# Patient Record
Sex: FEMALE | Race: WHITE
[De-identification: names, ages, dates, MRNs, and addresses within clinical notes are randomized per-mention and may not be internally consistent; named-entity substitution may affect disease eponyms.]

---

## 2018-08-01 ENCOUNTER — HOSPITAL ENCOUNTER (OUTPATIENT)
Age: 63
End: 2018-08-01
Payer: COMMERCIAL

## 2018-08-01 DIAGNOSIS — Z12.31: Primary | ICD-10-CM

## 2018-08-01 PROCEDURE — 77063 BREAST TOMOSYNTHESIS BI: CPT

## 2018-08-01 PROCEDURE — 77067 SCR MAMMO BI INCL CAD: CPT

## 2018-08-01 NOTE — DI.MG.S_ITS
BILATERAL DIGITAL SCREENING MAMMOGRAM 3D/2D WITH CAD: 8/1/2018  
CLINICAL: Routine screening.    
   
Comparison is made to exams dated:  12/9/2016 mammogram - [facility] in [place],   
9/29/2014 mammogram, and 9/27/2013 mammogram - [facility].    
There are scattered fibroglandular elements in both breasts.    
Current study was also evaluated with a Computer Aided Detection (CAD) system.    
There is a mole marker on the left breast.    
No significant masses, calcifications, or other findings are seen in either breast.    
There has been no significant interval change.  
   
IMPRESSION: NEGATIVE  
There is no mammographic evidence of malignancy. A 1 year screening mammogram is   
recommended.     
   
This exam was interpreted at Station ID: [id number].    
   
NOTE: For mammograms, a report in lay terms will be sent to the patient. Approximately   
15% of breast malignancies will not be visualized mammographically. In the management of   
a palpable breast mass, a negative mammogram must not discourage biopsy of a clinically   
suspicious lesion.  
   
Electronically Signed By: Jan cueto/vera:8/1/2018 20:04:23    
   
   
letter sent: Normal Exam    
ACR BI-RADS Category 1: Negative 3341F

## 2018-09-24 ENCOUNTER — HOSPITAL ENCOUNTER (OUTPATIENT)
Dept: HOSPITAL 76 - DI | Age: 63
Discharge: HOME | End: 2018-09-24
Attending: PHYSICIAN ASSISTANT
Payer: COMMERCIAL

## 2018-09-24 DIAGNOSIS — R94.31: ICD-10-CM

## 2018-09-24 DIAGNOSIS — Z82.49: ICD-10-CM

## 2018-09-24 DIAGNOSIS — R53.83: Primary | ICD-10-CM

## 2018-09-24 DIAGNOSIS — Z78.0: ICD-10-CM

## 2018-09-24 PROCEDURE — 93017 CV STRESS TEST TRACING ONLY: CPT

## 2018-09-24 NOTE — CARDIAC PROCEDURE NOTE
DATE OF SERVICE: 09/24/2018

Physician: Hanny Velarde MD, [facility]

 

INDICATIONS FOR TEST:  Fatigue with exertion, family history of early coronary 
disease.

 

CORONARY RISK FACTORS:  Family history of early coronary disease, postmenopausal
status, borderline cholesterol elevation.

 

After signing informed consent, the patient completed a Renato-protocol treadmill
stress test.

 

RESTING EKG:  Normal sinus rhythm, right IVCD.  

 

Resting blood pressure 90/60, peak blood pressure 140/60.  Resting heart rate 
78, peak heart rate 141 (which is 89 % predicted maximum heart rate for age).

 

Patient exercised for 3 minutes and 25 seconds on a Renato protocol.  The patient
achieved 89% predicted maximum heart rate for age, and 8.4 METS.  The patient 
had mild to moderate shortness of breath, no chest pain.

 

EKG AT PEAK:  T-wave inversion in leads III and aVF, and in leads V2 and V3.  
These resolved after 2 minutes of recovery.

 

IMPRESSION:

1.  Fair to poor exercise tolerance.

2.  Abnormal resting EKG (consideran adequate level of stress, indicating 
possible ischemia.

 

RECOMMENDATIONS:   Consider aggressive risk factor management and further 
cardiac evaluation with cardiac imaging: a Stress Echocardiogram, or stress test
with myocardial perfusion imaging using nuclear testing.

 

 

cc: Akilah Rivera PA-C

DD: 09/24/2018 11:20

TD: 09/24/2018 11:27

Job #: [id number]

SAMINA

## 2018-10-04 ENCOUNTER — HOSPITAL ENCOUNTER (OUTPATIENT)
Dept: HOSPITAL 76 - LAB.WCP | Age: 63
Discharge: HOME | End: 2018-10-04
Attending: PHYSICIAN ASSISTANT
Payer: COMMERCIAL

## 2018-10-04 DIAGNOSIS — Z00.00: Primary | ICD-10-CM

## 2018-10-04 LAB
ALBUMIN DIAFP-MCNC: 4 G/DL (ref 3.2–5.5)
ALBUMIN/GLOB SERPL: 1.2 {RATIO} (ref 1–2.2)
ALP SERPL-CCNC: 92 IU/L (ref 42–121)
ALT SERPL W P-5'-P-CCNC: 27 IU/L (ref 10–60)
ANION GAP SERPL CALCULATED.4IONS-SCNC: 9 MMOL/L (ref 6–13)
AST SERPL W P-5'-P-CCNC: 24 IU/L (ref 10–42)
BASOPHILS NFR BLD AUTO: 0.1 10^3/UL (ref 0–0.1)
BASOPHILS NFR BLD AUTO: 1.2 %
BILIRUB BLD-MCNC: 0.6 MG/DL (ref 0.2–1)
BUN SERPL-MCNC: 15 MG/DL (ref 6–20)
CALCIUM UR-MCNC: 9.4 MG/DL (ref 8.5–10.3)
CHLORIDE SERPL-SCNC: 101 MMOL/L (ref 101–111)
CHOLEST SERPL-MCNC: 249 MG/DL
CO2 SERPL-SCNC: 29 MMOL/L (ref 21–32)
CREAT SERPLBLD-SCNC: 0.8 MG/DL (ref 0.4–1)
EOSINOPHIL # BLD AUTO: 0.1 10^3/UL (ref 0–0.7)
EOSINOPHIL NFR BLD AUTO: 2.5 %
ERYTHROCYTE [DISTWIDTH] IN BLOOD BY AUTOMATED COUNT: 12.7 % (ref 12–15)
EST. AVERAGE GLUCOSE BLD GHB EST-MCNC: 105 MG/DL (ref 70–100)
GFRSERPLBLD MDRD-ARVRAT: 72 ML/MIN/{1.73_M2} (ref 89–?)
GLOBULIN SER-MCNC: 3.3 G/DL (ref 2.1–4.2)
GLUCOSE SERPL-MCNC: 98 MG/DL (ref 70–100)
HB2 TOTAL: 14.2 G/DL
HBA1C BLD-MCNC: 0.49 G/DL
HDLC SERPL-MCNC: 82 MG/DL
HDLC SERPL: 3 {RATIO} (ref ?–4.4)
HEMOGLOBIN A1C %: 5.3 % (ref 4.6–6.2)
HGB UR QL STRIP: 13.3 G/DL (ref 12–16)
LDLC SERPL CALC-MCNC: 153 MG/DL
LDLC/HDLC SERPL: 1.9 {RATIO} (ref ?–4.4)
LYMPHOCYTES # SPEC AUTO: 2.1 10^3/UL (ref 1.5–3.5)
LYMPHOCYTES NFR BLD AUTO: 47.5 %
MCH RBC QN AUTO: 32 PG (ref 27–31)
MCHC RBC AUTO-ENTMCNC: 34.8 G/DL (ref 32–36)
MCV RBC AUTO: 91.8 FL (ref 81–99)
MONOCYTES # BLD AUTO: 0.3 10^3/UL (ref 0–1)
MONOCYTES NFR BLD AUTO: 7.8 %
NEUTROPHILS # BLD AUTO: 1.8 10^3/UL (ref 1.5–6.6)
NEUTROPHILS # SNV AUTO: 4.3 X10^3/UL (ref 4.8–10.8)
NEUTROPHILS NFR BLD AUTO: 41 %
PDW BLD AUTO: 7.8 FL (ref 7.9–10.8)
PLATELET # BLD: 257 10^3/UL (ref 130–450)
PROT SPEC-MCNC: 7.3 G/DL (ref 6.7–8.2)
RBC MAR: 4.15 10^6/UL (ref 4.2–5.4)
SODIUM SERPLBLD-SCNC: 139 MMOL/L (ref 135–145)
VLDLC SERPL-SCNC: 14 MG/DL

## 2018-10-04 PROCEDURE — 85025 COMPLETE CBC W/AUTO DIFF WBC: CPT

## 2018-10-04 PROCEDURE — 83036 HEMOGLOBIN GLYCOSYLATED A1C: CPT

## 2018-10-04 PROCEDURE — 84443 ASSAY THYROID STIM HORMONE: CPT

## 2018-10-04 PROCEDURE — 83721 ASSAY OF BLOOD LIPOPROTEIN: CPT

## 2018-10-04 PROCEDURE — 80053 COMPREHEN METABOLIC PANEL: CPT

## 2018-10-04 PROCEDURE — 36415 COLL VENOUS BLD VENIPUNCTURE: CPT

## 2018-10-04 PROCEDURE — 80061 LIPID PANEL: CPT

## 2018-10-18 ENCOUNTER — HOSPITAL ENCOUNTER (OUTPATIENT)
Dept: HOSPITAL 76 - DI | Age: 63
Discharge: HOME | End: 2018-10-18
Attending: PHYSICIAN ASSISTANT
Payer: COMMERCIAL

## 2018-10-18 DIAGNOSIS — R53.83: ICD-10-CM

## 2018-10-18 DIAGNOSIS — R94.31: ICD-10-CM

## 2018-10-18 DIAGNOSIS — R94.39: Primary | ICD-10-CM

## 2018-10-18 PROCEDURE — 93351 STRESS TTE COMPLETE: CPT

## 2018-10-18 NOTE — CARDIAC PROCEDURE NOTE
DATE OF SERVICE: 10/18/2018

Physician: Hanny Velarde MD, [facility]

 

INDICATIONS:  Abnormal ETT, abnormal EKG, fatigue, family history of coronary 
artery disease.

 

CORONARY RISK FACTORS:  Family history of early heart disease, unknown menstrual
status.

 

SUMMARY:

After signing informed consent, the patient underwent a Renato protocol treadmill
stress test with Echo imaging at rest and at peak heart rate.

 

The patient exercised for 5 minutes and 35 seconds on a Renato protocol.  She 
achieved a peak heart rate of 138 (87% predicted maximal heart rate for age), 
and achieved 7.1 METS.  The patient had "fatigue" at peak, no chest pain.

 

Resting heart rate 65, peak heart rate 138 (87 percent PMHR).

 

Resting blood pressure 114/68, peak blood pressure 190/80.

 

In recovery, the patient described that her "fatigue" meant shortness of breath.
 The patient was no longer short of breath after 2 minutes of recovery.

 

RESTING EKG:  Normal sinus rhythm, incomplete right bundle branch block, early 
RS transition, incomplete LAFB, poor R-wave progression.

 

PEAK EKG:  No ischemic ST or T wave changes.

 

Echo images reported separately.

 

IMPRESSION  

1.  Fair exercise tolerance.

2.  Abnormal, excessive BP response to exercise.

3.  Abnormal resting EKG suggestive of right heart disease.

4.  No ischemic EKG changes at an adequate level of stress.

5.  Echo images reported separately.

 

RECOMMENDATIONS:  Consider pulmonary workup.

 

 

cc: Akilah Rivera PA-C

DD: 10/18/2018 13:46

TD: 10/18/2018 13:52

Job #: [id number]

MTDD

## 2020-07-30 ENCOUNTER — HOSPITAL ENCOUNTER (OUTPATIENT)
Age: 65
End: 2020-07-30
Payer: MEDICARE

## 2020-07-30 DIAGNOSIS — Z12.31: Primary | ICD-10-CM

## 2020-07-30 PROCEDURE — 77063 BREAST TOMOSYNTHESIS BI: CPT

## 2020-07-30 PROCEDURE — 77067 SCR MAMMO BI INCL CAD: CPT

## 2020-07-30 NOTE — DI.MG.S_ITS
BILATERAL DIGITAL SCREENING MAMMOGRAM 3D/2D WITH CAD: 7/30/2020  
   
CLINICAL: Routine screening.    
   
Comparison is made to exams dated:  12/9/2016 mammogram - [facility] in [place],   
8/1/2018 mammogram, and [facility]  There are scattered   
fibroglandular elements in both breasts.    
   
Current study was also evaluated with a Computer Aided Detection (CAD) system.    
No significant masses, calcifications, or other findings are seen in either breast.    
There has been no significant interval change.  
   
IMPRESSION: NEGATIVE  
There is no mammographic evidence of malignancy. A 1 year screening mammogram is   
recommended.    
   
   
This exam was interpreted at Station ID: [id number].    
   
NOTE: For mammograms, a report in lay terms will be sent to the patient. Approximately   
15% of breast malignancies will not be visualized mammographically. In the management of   
a palpable breast mass, a negative mammogram must not discourage biopsy of a clinically   
suspicious lesion.  
   
Electronically Signed By: Jericho kulkarni/vera:7/31/2020 08:21:49    
   
   
letter sent: Normal Exam    
ACR BI-RADS Category 1: Negative 3341F

## 2022-01-06 ENCOUNTER — HOSPITAL ENCOUNTER (OUTPATIENT)
Age: 67
End: 2022-01-06
Payer: MEDICARE

## 2022-01-06 DIAGNOSIS — N28.9: Primary | ICD-10-CM

## 2022-01-06 PROCEDURE — 76770 US EXAM ABDO BACK WALL COMP: CPT

## 2022-06-17 ENCOUNTER — HOSPITAL ENCOUNTER (OUTPATIENT)
Age: 67
End: 2022-06-17
Payer: MEDICARE

## 2022-06-17 DIAGNOSIS — N94.89: ICD-10-CM

## 2022-06-17 DIAGNOSIS — R93.5: ICD-10-CM

## 2022-06-17 DIAGNOSIS — K57.90: ICD-10-CM

## 2022-06-17 DIAGNOSIS — N28.89: Primary | ICD-10-CM

## 2022-06-17 PROCEDURE — A9579 GAD-BASE MR CONTRAST NOS,1ML: HCPCS

## 2022-06-17 PROCEDURE — 72197 MRI PELVIS W/O & W/DYE: CPT

## 2022-06-17 PROCEDURE — 74183 MRI ABD W/O CNTR FLWD CNTR: CPT

## 2022-07-25 ENCOUNTER — HOSPITAL ENCOUNTER (EMERGENCY)
Age: 67
Discharge: HOME | End: 2022-07-25
Payer: MEDICARE

## 2022-07-25 VITALS
SYSTOLIC BLOOD PRESSURE: 123 MMHG | HEART RATE: 85 BPM | DIASTOLIC BLOOD PRESSURE: 77 MMHG | TEMPERATURE: 97.2 F | RESPIRATION RATE: 12 BRPM | OXYGEN SATURATION: 97 %

## 2022-07-25 VITALS
HEART RATE: 76 BPM | OXYGEN SATURATION: 99 % | DIASTOLIC BLOOD PRESSURE: 77 MMHG | RESPIRATION RATE: 17 BRPM | SYSTOLIC BLOOD PRESSURE: 118 MMHG

## 2022-07-25 VITALS — BODY MASS INDEX: 24.7 KG/M2

## 2022-07-25 DIAGNOSIS — Z20.822: ICD-10-CM

## 2022-07-25 DIAGNOSIS — J06.9: Primary | ICD-10-CM

## 2022-07-25 PROCEDURE — 87070 CULTURE OTHR SPECIMN AEROBIC: CPT

## 2022-07-25 PROCEDURE — 87635 SARS-COV-2 COVID-19 AMP PRB: CPT

## 2022-07-25 PROCEDURE — 87880 STREP A ASSAY W/OPTIC: CPT

## 2022-07-25 PROCEDURE — 99282 EMERGENCY DEPT VISIT SF MDM: CPT

## 2023-11-02 ENCOUNTER — APPOINTMENT (RX ONLY)
Dept: URBAN - METROPOLITAN AREA CLINIC 167 | Facility: CLINIC | Age: 68
Setting detail: DERMATOLOGY
End: 2023-11-02

## 2023-11-02 DIAGNOSIS — L82.1 OTHER SEBORRHEIC KERATOSIS: ICD-10-CM

## 2023-11-02 DIAGNOSIS — L81.4 OTHER MELANIN HYPERPIGMENTATION: ICD-10-CM

## 2023-11-02 DIAGNOSIS — Z71.89 OTHER SPECIFIED COUNSELING: ICD-10-CM

## 2023-11-02 DIAGNOSIS — D22 MELANOCYTIC NEVI: ICD-10-CM

## 2023-11-02 DIAGNOSIS — L57.8 OTHER SKIN CHANGES DUE TO CHRONIC EXPOSURE TO NONIONIZING RADIATION: ICD-10-CM

## 2023-11-02 DIAGNOSIS — D18.0 HEMANGIOMA: ICD-10-CM

## 2023-11-02 DIAGNOSIS — L82.0 INFLAMED SEBORRHEIC KERATOSIS: ICD-10-CM

## 2023-11-02 DIAGNOSIS — L57.0 ACTINIC KERATOSIS: ICD-10-CM

## 2023-11-02 PROBLEM — D18.01 HEMANGIOMA OF SKIN AND SUBCUTANEOUS TISSUE: Status: ACTIVE | Noted: 2023-11-02

## 2023-11-02 PROBLEM — D22.5 MELANOCYTIC NEVI OF TRUNK: Status: ACTIVE | Noted: 2023-11-02

## 2023-11-02 PROCEDURE — ? TREATMENT REGIMEN

## 2023-11-02 PROCEDURE — ? LIQUID NITROGEN

## 2023-11-02 PROCEDURE — ? PRESCRIPTION

## 2023-11-02 PROCEDURE — 99204 OFFICE O/P NEW MOD 45 MIN: CPT | Mod: 25

## 2023-11-02 PROCEDURE — 17110 DESTRUCTION B9 LES UP TO 14: CPT

## 2023-11-02 PROCEDURE — ? COUNSELING

## 2023-11-02 RX ORDER — FLUOROURACIL 5 MG/G
CREAM TOPICAL QHS
Qty: 40 | Refills: 0 | Status: ERX | COMMUNITY
Start: 2023-11-02

## 2023-11-02 RX ADMIN — FLUOROURACIL: 5 CREAM TOPICAL at 00:00

## 2023-11-02 ASSESSMENT — LOCATION ZONE DERM
LOCATION ZONE: NECK
LOCATION ZONE: FACE
LOCATION ZONE: ARM
LOCATION ZONE: TRUNK

## 2023-11-02 ASSESSMENT — LOCATION SIMPLE DESCRIPTION DERM
LOCATION SIMPLE: NECK
LOCATION SIMPLE: LOWER BACK
LOCATION SIMPLE: LEFT FOREHEAD
LOCATION SIMPLE: LEFT FOREARM
LOCATION SIMPLE: LEFT ELBOW
LOCATION SIMPLE: LEFT WRIST
LOCATION SIMPLE: ABDOMEN
LOCATION SIMPLE: UPPER BACK

## 2023-11-02 ASSESSMENT — LOCATION DETAILED DESCRIPTION DERM
LOCATION DETAILED: SUPERIOR LUMBAR SPINE
LOCATION DETAILED: INFERIOR THORACIC SPINE
LOCATION DETAILED: SUPERIOR THORACIC SPINE
LOCATION DETAILED: LEFT ELBOW
LOCATION DETAILED: LEFT MEDIAL FOREHEAD
LOCATION DETAILED: LEFT PROXIMAL DORSAL FOREARM
LOCATION DETAILED: RIGHT CENTRAL LATERAL NECK
LOCATION DETAILED: LEFT DORSAL WRIST
LOCATION DETAILED: LEFT RIB CAGE

## 2023-11-02 NOTE — PROCEDURE: LIQUID NITROGEN
Medical Necessity Clause: This procedure was medically necessary because the lesions that were treated were:
Consent: The patient's consent was obtained including but not limited to risks of crusting, scabbing, blistering, scarring, darker or lighter pigmentary change, recurrence, incomplete removal and infection.
Spray Paint Text: The liquid nitrogen was applied to the skin utilizing a spray paint frosting technique.
Render Note In Bullet Format When Appropriate: No
Medical Necessity Information: It is in your best interest to select a reason for this procedure from the list below. All of these items fulfill various CMS LCD requirements except the new and changing color options.
Post-Care Instructions: I reviewed with the patient in detail post-care instructions. Patient is to wear sunprotection, and avoid picking at any of the treated lesions. Pt may apply Vaseline to crusted or scabbing areas.
Show Applicator Variable?: Yes
Detail Level: Detailed